# Patient Record
Sex: FEMALE | Race: OTHER
[De-identification: names, ages, dates, MRNs, and addresses within clinical notes are randomized per-mention and may not be internally consistent; named-entity substitution may affect disease eponyms.]

---

## 2019-06-09 ENCOUNTER — HOSPITAL ENCOUNTER (EMERGENCY)
Dept: HOSPITAL 12 - ER | Age: 29
LOS: 1 days | Discharge: ANOTHER HOSPITAL | End: 2019-06-10
Payer: MEDICAID

## 2019-06-09 VITALS — WEIGHT: 113 LBS | HEIGHT: 65 IN | BODY MASS INDEX: 18.83 KG/M2

## 2019-06-09 DIAGNOSIS — Z91.018: ICD-10-CM

## 2019-06-09 DIAGNOSIS — R42: ICD-10-CM

## 2019-06-09 DIAGNOSIS — H53.8: Primary | ICD-10-CM

## 2019-06-09 DIAGNOSIS — R11.0: ICD-10-CM

## 2019-06-09 DIAGNOSIS — R07.89: ICD-10-CM

## 2019-06-09 DIAGNOSIS — Z88.8: ICD-10-CM

## 2019-06-09 DIAGNOSIS — Z88.0: ICD-10-CM

## 2019-06-09 DIAGNOSIS — Z88.2: ICD-10-CM

## 2019-06-09 DIAGNOSIS — Z88.5: ICD-10-CM

## 2019-06-09 LAB
BASOPHILS # BLD AUTO: 0 K/UL (ref 0–8)
BASOPHILS NFR BLD AUTO: 0.4 % (ref 0–2)
BUN SERPL-MCNC: 15 MG/DL (ref 7–18)
CHLORIDE SERPL-SCNC: 103 MMOL/L (ref 98–107)
CO2 SERPL-SCNC: 27 MMOL/L (ref 21–32)
CREAT SERPL-MCNC: 0.4 MG/DL (ref 0.6–1.3)
EOSINOPHIL # BLD AUTO: 0.3 K/UL (ref 0–0.7)
EOSINOPHIL NFR BLD AUTO: 2.8 % (ref 0–7)
GLUCOSE SERPL-MCNC: 89 MG/DL (ref 74–106)
HCG UR QL: NEGATIVE
HCT VFR BLD AUTO: 40.2 % (ref 31.2–41.9)
HGB BLD-MCNC: 13.4 G/DL (ref 10.9–14.3)
LYMPHOCYTES # BLD AUTO: 1.3 K/UL (ref 20–40)
LYMPHOCYTES NFR BLD AUTO: 13.6 % (ref 20.5–51.5)
MCH RBC QN AUTO: 30 UUG (ref 24.7–32.8)
MCHC RBC AUTO-ENTMCNC: 33 G/DL (ref 32.3–35.6)
MCV RBC AUTO: 90 FL (ref 75.5–95.3)
MONOCYTES # BLD AUTO: 0.7 K/UL (ref 2–10)
MONOCYTES NFR BLD AUTO: 7.4 % (ref 0–11)
NEUTROPHILS # BLD AUTO: 7.4 K/UL (ref 1.8–8.9)
NEUTROPHILS NFR BLD AUTO: 75.8 % (ref 38.5–71.5)
PLATELET # BLD AUTO: 257 K/UL (ref 179–408)
POTASSIUM SERPL-SCNC: 4.1 MMOL/L (ref 3.5–5.1)
RBC # BLD AUTO: 4.47 MIL/UL (ref 3.63–4.92)
WBC # BLD AUTO: 9.7 K/UL (ref 3.8–11.8)

## 2019-06-09 PROCEDURE — 36415 COLL VENOUS BLD VENIPUNCTURE: CPT

## 2019-06-09 PROCEDURE — A4663 DIALYSIS BLOOD PRESSURE CUFF: HCPCS

## 2019-06-09 PROCEDURE — 85379 FIBRIN DEGRADATION QUANT: CPT

## 2019-06-09 PROCEDURE — 70496 CT ANGIOGRAPHY HEAD: CPT

## 2019-06-09 PROCEDURE — 96360 HYDRATION IV INFUSION INIT: CPT

## 2019-06-09 PROCEDURE — 93005 ELECTROCARDIOGRAM TRACING: CPT

## 2019-06-09 PROCEDURE — 80048 BASIC METABOLIC PNL TOTAL CA: CPT

## 2019-06-09 PROCEDURE — 71275 CT ANGIOGRAPHY CHEST: CPT

## 2019-06-09 PROCEDURE — 70450 CT HEAD/BRAIN W/O DYE: CPT

## 2019-06-09 PROCEDURE — 99285 EMERGENCY DEPT VISIT HI MDM: CPT

## 2019-06-09 PROCEDURE — 84703 CHORIONIC GONADOTROPIN ASSAY: CPT

## 2019-06-09 PROCEDURE — 85025 COMPLETE CBC W/AUTO DIFF WBC: CPT

## 2019-06-09 PROCEDURE — 84484 ASSAY OF TROPONIN QUANT: CPT

## 2019-06-09 PROCEDURE — 71045 X-RAY EXAM CHEST 1 VIEW: CPT

## 2019-06-09 PROCEDURE — 70498 CT ANGIOGRAPHY NECK: CPT

## 2019-06-09 NOTE — NUR
Patient ambulated with stable gait. Speech is clear, speaks in complete 
sentences. A/Ox4. Patient came for c/o left sided chest pain that initially 
started x4 days ago; recent pain radiation to left shoulder and arm x1 hr PTA. 
Respiratory even and unlabored, no cough no sob. Patient also c/o n/v x4 
episodes today.



Patient in bed at lowest position, sr upx2, call light within reach. Fall 
precautions implemented per protocol. Mother at bedside.

## 2019-06-09 NOTE — NUR
-------------------------------------------------------------------------------

            *** Note undone in EDM - 06/10/19 at 0033 by AARON ***            

-------------------------------------------------------------------------------

PATIENT C/O LEFT SIDE CP X4 DAYS BUT HAS BEEN RADIATING TO LEFT SHOULDER AND 
ARM X1HR PTA. ALSO C/O N/V X4 EPISODE TODAY.



Patient ambulated with stable gait. Speech is clear, speaks in complete 
sentences. A/Ox4. Patient came for c/o left sided chest pain that initially 
started x4 days ago; recent pain radiation to left shoulder and arm x1 hr PTA. 
Respiratory even and unlabored, no cough no sob. Patient also c/o n/v x4 
episodes today.



Patient in bed at lowest position, sr upx2, call light within reach. Fall 
precautions implemented per protocol. Mother at bedside.

## 2019-06-09 NOTE — NUR
PATIENT C/O LEFT SIDE CP X4 DAYS BUT HAS BEEN RADIATING TO LEFT SHOULDER AND 
ARM X1HR PTA. ALSO C/O N/V X4 EPISODE TODAY.



Patient ambulated with stable gait. Speech is clear, speaks in complete 
sentences. A/Ox4. Patient came for c/o left sided chest pain that initially 
started x4 days ago; recent pain radiation to left shoulder and arm x1 hr PTA. 
Respiratory even and unlabored, no cough no sob. Patient also c/o n/v x4 
episodes today.



Patient in bed at lowest position, sr upx2, call light within reach. Fall 
precautions implemented per protocol. Mother at bedside.

## 2019-06-10 NOTE — NUR
RECEIVED SHIFT REPORT FROM KAYCE VALENTIN RN. 



PT RESTING COMFORTABLY IN BED. VSS. AWAITING TRANSFER ARRIVAL.